# Patient Record
Sex: MALE | Race: WHITE | URBAN - METROPOLITAN AREA
[De-identification: names, ages, dates, MRNs, and addresses within clinical notes are randomized per-mention and may not be internally consistent; named-entity substitution may affect disease eponyms.]

---

## 2021-06-01 ENCOUNTER — HISTORICAL (OUTPATIENT)
Dept: ANESTHESIOLOGY | Facility: HOSPITAL | Age: 36
End: 2021-06-01

## 2021-07-27 ENCOUNTER — HISTORICAL (OUTPATIENT)
Dept: ANESTHESIOLOGY | Facility: HOSPITAL | Age: 36
End: 2021-07-27

## 2022-05-01 NOTE — HISTORICAL [facility]ER
This is a historical note converted from [employer]. Formatting and pictures may have been removed.  Please reference [employer] for original formatting and attached multimedia. Type of Procedure: Lumbar Transforaminal Epidural Steroid Injection  ?  Physician: Naun Allred III, MD  ?  Diagnosis: Lumbar radiculopathy  ?  Description of Procedure:  After appropriate informed consent discussing the risks, benefits and possible complications, the patient was taken to the procedure suite. NIBP, pulse rate, and oxygen saturation were monitored throughout the procedure.?  ?  The patient was positioned prone. The skin was prepped and draped in a sterile fashion. The left L4 pedicle was identified fluoroscopically via an oblique view. The skin and subcutaneous tissues were anesthetized with approximately 5 cc of 1% lidocaine. At this point, a 22-gauge 5? spinal needle was atraumatically introduced and advanced under fluoroscopic guidance to the anterosuperior aspect of the foramen. Depth was gauged on lateral view. Needle position at approximately the 6 o?clock position relative to the pedicle was confirmed in the AP view. Following negative aspiration for blood and CSF and confirming the absence of paresthesias, injection of approximately 1 cc of Omnipaque 300 demonstrated excellent spread along the nerve root into the epidural space. At this point, 10 mg of preservative-free dexamethasone mixed with 1 mL of 0.25% bupivacaine was injected without complication.  ?  The needle was re-styletted and removed. Adhesive dressing was applied over the site. Patient tolerated the procedure well without any apparent complications. The patient was transferred back to the recovery area and then discharged home.

## 2022-05-01 NOTE — HISTORICAL [facility]
This is a historical note converted from [employer]. Formatting and pictures may have been removed.  Please reference [employer] for original formatting and attached multimedia. PRE-PROCEDURE H&P  ?  HPI:  The patient denies any change in pain history since their last office visit?with Dr. Romo. No new numbness or weakness reported. No new bowel or bladder incontinence. The patient denies any recent use of anticoagulation. No recent infections or fevers.  ?  PAST MEDICAL AND SURGICAL HISTORY:  Reviewed.  ?  ALLERGIES:  Reviewed in chart and confirmed no allergy to local anesthetic, steroids, or contrast.?  ?  MEDICATIONS:  Reviewed.?  ?  PHYSICAL EXAM:  General: Not in acute distress.  ? ? ?Appearance: Normal appearance. Well-developed.  HEENT:  ? ? ?Head: Normocephalic and atraumatic.  ? ? ?Eyes: Extraocular movements intact.  Pulmonary:  ? ?Effort: Pulmonary effort is normal. No respiratory distress.  Musculoskeletal: Grossly normal. ? ??  ? ?General: No deformity.  Skin:  ? ?General: Skin is warm and dry.  Neurological:  ? ?Mental Status: Alert and oriented x3.?  ? ?Sensory: Grossly intact.?  ? ?Motor: Moves all 4 extremities to gravity.?  Psychiatric: ? ?  ? ?Mood and Affect: Mood normal. ? ?  ? ?Behavior: Behavior normal. ? ?  ? ?Thought Content: Thought content normal. ? ?  ? ?Judgment: Judgment normal.  ?  ASSESSMENT:  36-year-old male referred for?lumbar VICENTE.? Chronic low back pain with?left lower extremity?radiculitis.? Referred for left L4-5 transforaminal VICENTE.  ?  PLAN:  Proceed with above procedure.  ?

## 2022-05-01 NOTE — HISTORICAL [facility]
This is a historical note converted from [employer]. Formatting and pictures may have been removed.  Please reference [employer] for original formatting and attached multimedia. Type of Procedure: Left L4?transforaminal Epidural Steroid Injection  ?  Physician: Naun Allred III, MD  ?  Diagnosis: Lumbar radiculopathy  ?  Description of Procedure:  After appropriate informed consent discussing the risks, benefits and possible complications, the patient was taken to the procedure suite. NIBP, pulse rate, and oxygen saturation were monitored throughout the procedure.?  ?  The patient was positioned prone. The skin was prepped and draped in a sterile fashion. The left L4 pedicle was identified fluoroscopically via an oblique view. The skin and subcutaneous tissues were anesthetized with approximately 5 cc of 1% lidocaine. At this point, a 22-gauge 5? spinal needle was atraumatically introduced and advanced under fluoroscopic guidance to the anterosuperior aspect of the foramen. Depth was gauged on lateral view. Needle position at approximately the 6 o?clock position relative to the pedicle was confirmed in the AP view. Following negative aspiration for blood and CSF and confirming the absence of paresthesias, injection of approximately 1 cc of Omnipaque 300 demonstrated excellent spread along the nerve root into the epidural space. At this point, 10 mg of preservative-free dexamethasone mixed with 1 mL of 0.25% bupivacaine was injected without complication.  ?  The needle was re-styletted and removed. Adhesive dressing was applied over the site. Patient tolerated the procedure well without any apparent complications. The patient was transferred back to the recovery area and then discharged home.

## 2022-05-01 NOTE — HISTORICAL [facility]
This is a historical note converted from [employer]. Formatting and pictures may have been removed.  Please reference [employer] for original formatting and attached multimedia. PRE-PROCEDURE H&P  ?  HPI:  The patient denies any change in pain history since their last office visit. No new numbness or weakness reported. No new bowel or bladder incontinence. The patient denies any recent use of anticoagulation. No recent infections or fevers.  ?  PAST MEDICAL AND SURGICAL HISTORY:  Reviewed.  ?  ALLERGIES:  Reviewed in chart and confirmed no allergy to local anesthetic, steroids, or contrast.?  ?  MEDICATIONS:  Reviewed.?  ?  PHYSICAL EXAM:  General: Not in acute distress.  ? ? ?Appearance: Normal appearance. Well-developed.  HEENT:  ? ? ?Head: Normocephalic and atraumatic.  ? ? ?Eyes: Extraocular movements intact.  Pulmonary:  ? ?Effort: Pulmonary effort is normal. No respiratory distress.  Musculoskeletal: Grossly normal. ? ??  ? ?General: No deformity.  Skin:  ? ?General: Skin is warm and dry.  Neurological:  ? ?Mental Status: Alert and oriented x3.?  ? ?Sensory: Grossly intact.?  ? ?Motor: Moves all 4 extremities to gravity.?  Psychiatric: ? ?  ? ?Mood and Affect: Mood normal. ? ?  ? ?Behavior: Behavior normal. ? ?  ? ?Thought Content: Thought content normal. ? ?  ? ?Judgment: Judgment normal.  ?  ASSESSMENT:  36-year-old male presents for repeat left L4 transforaminal VICENTE.? Patient reports 100% relief for 3 days following the last injection.? He had some?degree of relief?for a period of time after.? He believes it was about 60%. ?He is unsure how exactly long it lasted.? Patient is interested in repeating this injection as much as possible to manage symptoms in hopes of avoiding surgery.  ?  PLAN:  Proceed with above procedure.  ?